# Patient Record
Sex: FEMALE | Race: WHITE | NOT HISPANIC OR LATINO | URBAN - METROPOLITAN AREA
[De-identification: names, ages, dates, MRNs, and addresses within clinical notes are randomized per-mention and may not be internally consistent; named-entity substitution may affect disease eponyms.]

---

## 2021-12-07 ENCOUNTER — APPOINTMENT (RX ONLY)
Dept: URBAN - METROPOLITAN AREA CLINIC 41 | Facility: CLINIC | Age: 31
Setting detail: DERMATOLOGY
End: 2021-12-07

## 2021-12-07 DIAGNOSIS — H04.55 ACQUIRED STENOSIS OF NASOLACRIMAL DUCT: ICD-10-CM

## 2021-12-07 DIAGNOSIS — Q826 OTHER SPECIFIED ANOMALIES OF SKIN: ICD-10-CM

## 2021-12-07 DIAGNOSIS — Q828 OTHER SPECIFIED ANOMALIES OF SKIN: ICD-10-CM

## 2021-12-07 DIAGNOSIS — D22 MELANOCYTIC NEVI: ICD-10-CM

## 2021-12-07 DIAGNOSIS — Q819 OTHER SPECIFIED ANOMALIES OF SKIN: ICD-10-CM

## 2021-12-07 PROBLEM — D22.39 MELANOCYTIC NEVI OF OTHER PARTS OF FACE: Status: ACTIVE | Noted: 2021-12-07

## 2021-12-07 PROBLEM — L85.8 OTHER SPECIFIED EPIDERMAL THICKENING: Status: ACTIVE | Noted: 2021-12-07

## 2021-12-07 PROBLEM — H04.551 ACQUIRED STENOSIS OF RIGHT NASOLACRIMAL DUCT: Status: ACTIVE | Noted: 2021-12-07

## 2021-12-07 PROCEDURE — 99203 OFFICE O/P NEW LOW 30 MIN: CPT

## 2021-12-07 PROCEDURE — ? TREATMENT REGIMEN

## 2021-12-07 PROCEDURE — ? PRESCRIPTION

## 2021-12-07 PROCEDURE — ? COUNSELING

## 2021-12-07 RX ORDER — ERYTHROMYCIN 5 MG/G
THIN LAYER OINTMENT OPHTHALMIC ONCE DAILY
Qty: 3.5 | Refills: 1 | Status: ERX | COMMUNITY
Start: 2021-12-07

## 2021-12-07 RX ADMIN — ERYTHROMYCIN THIN LAYER: 5 OINTMENT OPHTHALMIC at 00:00

## 2021-12-07 ASSESSMENT — LOCATION DETAILED DESCRIPTION DERM
LOCATION DETAILED: LEFT ANTERIOR PROXIMAL UPPER ARM
LOCATION DETAILED: RIGHT ANTERIOR PROXIMAL UPPER ARM
LOCATION DETAILED: RIGHT MEDIAL CANTHUS
LOCATION DETAILED: LEFT MEDIAL EYEBROW

## 2021-12-07 ASSESSMENT — LOCATION ZONE DERM
LOCATION ZONE: EYELID
LOCATION ZONE: FACE
LOCATION ZONE: ARM

## 2021-12-07 ASSESSMENT — LOCATION SIMPLE DESCRIPTION DERM
LOCATION SIMPLE: LEFT UPPER ARM
LOCATION SIMPLE: RIGHT UPPER ARM
LOCATION SIMPLE: LEFT EYEBROW
LOCATION SIMPLE: RIGHT EYELID

## 2021-12-07 NOTE — HPI: EVALUATION OF SKIN LESION(S)
Hpi Title: Evaluation of a Skin Lesion
How Severe Are Your Spot(S)?: mild
Have Your Spot(S) Been Treated In The Past?: has not been treated
Family Member: Maternal uncle
Additional History: Patient is currently pregnant. Patient had a boil a long time ago. It flares up three times annually. Patient takes antibiotics which makes it go away. It was drained previously with stent placement.

## 2021-12-07 NOTE — PROCEDURE: TREATMENT REGIMEN
Initiate Treatment: Erythromycin eye ointment - apply twice daily x10 days if it becomes inflamed
Detail Level: Zone
Plan: It was previously treated with a stent that failed. When the patient rubs it, the spot  becomes red and irritated. She reports when it is inflamed, pus comes out through the eye, not the visible skin. She has managed it with oral abx and warm compresses in the past. \\n\\nDiscussed that she should seek proper treatment by an ophthalmologist or oculoplastic surgeon due to the sensitive areas and structures involved.
Plan: The nevus appears to be completely benign. Recommended leaving the lesion as removal would result in a scar and the lesion could grow back. Discussed removal would be better after pregnancy if she decides to pursue it.
Samples Given: Amlactin lotion
Otc Regimen: Amlactin lotion - Use every day after a shower